# Patient Record
Sex: MALE | Race: BLACK OR AFRICAN AMERICAN | Employment: UNEMPLOYED | URBAN - METROPOLITAN AREA
[De-identification: names, ages, dates, MRNs, and addresses within clinical notes are randomized per-mention and may not be internally consistent; named-entity substitution may affect disease eponyms.]

---
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
## 2020-04-07 ENCOUNTER — TELEPHONE (OUTPATIENT)
Dept: FAMILY MEDICINE CLINIC | Age: 20
End: 2020-04-07

## 2020-04-07 ENCOUNTER — VIRTUAL VISIT (OUTPATIENT)
Dept: FAMILY MEDICINE CLINIC | Age: 20
End: 2020-04-07
Payer: COMMERCIAL

## 2020-04-07 VITALS — WEIGHT: 308 LBS | BODY MASS INDEX: 39.54 KG/M2 | TEMPERATURE: 99.4 F

## 2020-04-07 DIAGNOSIS — E11.9 NEW ONSET TYPE 2 DIABETES MELLITUS (HCC): ICD-10-CM

## 2020-04-07 DIAGNOSIS — R73.9 HYPERGLYCEMIA: ICD-10-CM

## 2020-04-07 LAB
A/G RATIO: 2 (ref 1.1–2.2)
ALBUMIN SERPL-MCNC: 4.7 G/DL (ref 3.4–5)
ALP BLD-CCNC: 130 U/L (ref 40–129)
ALT SERPL-CCNC: 59 U/L (ref 10–40)
ANION GAP SERPL CALCULATED.3IONS-SCNC: 18 MMOL/L (ref 3–16)
AST SERPL-CCNC: 31 U/L (ref 15–37)
BILIRUB SERPL-MCNC: 1.9 MG/DL (ref 0–1)
BUN BLDV-MCNC: 11 MG/DL (ref 7–20)
CALCIUM SERPL-MCNC: 10.2 MG/DL (ref 8.3–10.6)
CHLORIDE BLD-SCNC: 93 MMOL/L (ref 99–110)
CHOLESTEROL, TOTAL: 160 MG/DL (ref 0–199)
CO2: 27 MMOL/L (ref 21–32)
CREAT SERPL-MCNC: 1.9 MG/DL (ref 0.9–1.3)
CREATININE URINE: 101.3 MG/DL (ref 39–259)
GFR AFRICAN AMERICAN: 55
GFR NON-AFRICAN AMERICAN: 46
GLOBULIN: 2.4 G/DL
GLUCOSE BLD-MCNC: 483 MG/DL (ref 70–99)
HDLC SERPL-MCNC: 32 MG/DL (ref 40–60)
LDL CHOLESTEROL CALCULATED: 86 MG/DL
MICROALBUMIN UR-MCNC: <1.2 MG/DL
MICROALBUMIN/CREAT UR-RTO: NORMAL MG/G (ref 0–30)
POTASSIUM SERPL-SCNC: 4.6 MMOL/L (ref 3.5–5.1)
SODIUM BLD-SCNC: 138 MMOL/L (ref 136–145)
TOTAL PROTEIN: 7.1 G/DL (ref 6.4–8.2)
TRIGL SERPL-MCNC: 212 MG/DL (ref 0–150)
VLDLC SERPL CALC-MCNC: 42 MG/DL

## 2020-04-07 PROCEDURE — 99214 OFFICE O/P EST MOD 30 MIN: CPT | Performed by: NURSE PRACTITIONER

## 2020-04-07 RX ORDER — LANCETS 30 GAUGE
1 EACH MISCELLANEOUS 2 TIMES DAILY
Qty: 100 EACH | Refills: 1 | Status: SHIPPED | OUTPATIENT
Start: 2020-04-07

## 2020-04-07 RX ORDER — METFORMIN HYDROCHLORIDE 500 MG/1
500 TABLET, EXTENDED RELEASE ORAL
Qty: 30 TABLET | Refills: 2 | Status: SHIPPED | OUTPATIENT
Start: 2020-04-07 | End: 2020-05-22

## 2020-04-07 RX ORDER — BLOOD-GLUCOSE METER
1 KIT MISCELLANEOUS DAILY
Qty: 1 KIT | Refills: 0 | Status: SHIPPED | OUTPATIENT
Start: 2020-04-07 | End: 2020-05-22

## 2020-04-07 ASSESSMENT — ENCOUNTER SYMPTOMS
ABDOMINAL PAIN: 0
CONSTIPATION: 0
NAUSEA: 0
SHORTNESS OF BREATH: 0
DIARRHEA: 0
COUGH: 0
VOMITING: 0

## 2020-04-07 ASSESSMENT — PATIENT HEALTH QUESTIONNAIRE - PHQ9
SUM OF ALL RESPONSES TO PHQ9 QUESTIONS 1 & 2: 0
SUM OF ALL RESPONSES TO PHQ QUESTIONS 1-9: 0
1. LITTLE INTEREST OR PLEASURE IN DOING THINGS: 0
SUM OF ALL RESPONSES TO PHQ QUESTIONS 1-9: 0
2. FEELING DOWN, DEPRESSED OR HOPELESS: 0

## 2020-04-07 NOTE — TELEPHONE ENCOUNTER
MOP called very concerned about pts blood sugar. Was diagnosed last year with prediabetes  She states as of the last week he has been very fatigued, c/o nausea, frequent urination. Mom is diabetic so she checked his sugar. Was 220 last night and 487 this morning. She is very concerned and want him to be seen. Can I schedule him with you at [facility]?

## 2020-04-07 NOTE — PROGRESS NOTES
2020    TELEHEALTH EVALUATION -- Audio/Visual (During ETARV-47 public health emergency)    HPI:    Suraj Sandoval (:  2000) has requested an audio/video evaluation for the following concern(s):  Chief Complaint   Patient presents with    Other     glucose this morning was 487     Moraima Langford has history of prediabetes and last seen his PCP 2019. Patient reports that he has had increased fatigue for the past couple of days. Denies abd pain. Positive for increased urinary frequency. Denies dysuria. Currently drinking water. Reports that prior he was drinking 12 pack of mountain dew per day. Does not exercise. Does not follow a specific diet. Reports that his mother checked his BS and it was 487 this AM. Last night was 282. His mother has DM. Lab Results   Component Value Date    LABA1C 5.6 2019     No results found for: LABMICR, CREATININE  No results found for: ALT, AST  No results found for: CHOL, TRIG, HDL, LDLCALC, LDLDIRECT       Review of Systems   Constitutional: Positive for activity change and fatigue. Negative for fever. Respiratory: Negative for cough and shortness of breath. Cardiovascular: Negative for chest pain. Gastrointestinal: Negative for abdominal pain, constipation, diarrhea, nausea and vomiting. Genitourinary: Positive for frequency. Negative for difficulty urinating, dysuria and hematuria. Neurological: Negative for dizziness and headaches. Prior to Visit Medications    Medication Sig Taking?  Authorizing Provider   montelukast (SINGULAIR) 10 MG tablet Take 1 tablet by mouth nightly Yes Sundeep Cerna MD   CPAP Machine MISC by Does not apply route Yes Historical Provider, MD   fluticasone (FLONASE) 50 MCG/ACT nasal spray SPRAY 2 SPRAYS INTO EACH NOSTRIL EVERY DAY  Patient not taking: Reported on 2020  Sundeep Cerna MD   cetirizine (ZYRTEC) 10 MG tablet Take 1 tablet by mouth daily  Patient not taking: Reported on 2019  [facility] been advised to contact this office for worsening conditions or problems, and seek emergency medical treatment and/or call 911 if deemed necessary. Services were provided through a video synchronous discussion virtually to substitute for in-person clinic visit. Patient and provider were located at their individual homes. --OCTAVIANO Blackmon - CNP on 4/7/2020 at 10:30 AM    An electronic signature was used to authenticate this note.

## 2020-04-08 ENCOUNTER — PATIENT MESSAGE (OUTPATIENT)
Dept: FAMILY MEDICINE CLINIC | Age: 20
End: 2020-04-08

## 2020-04-08 PROBLEM — E11.9 DIABETES MELLITUS, NEW ONSET (HCC): Status: ACTIVE | Noted: 2020-04-08

## 2020-04-08 LAB
ESTIMATED AVERAGE GLUCOSE: 263.3 MG/DL
HBA1C MFR BLD: 10.8 %

## 2020-04-08 RX ORDER — INSULIN GLARGINE 100 [IU]/ML
10 INJECTION, SOLUTION SUBCUTANEOUS NIGHTLY
Qty: 5 PEN | Refills: 0 | Status: SHIPPED | OUTPATIENT
Start: 2020-04-08 | End: 2020-04-13 | Stop reason: SDUPTHER

## 2020-04-10 DIAGNOSIS — E11.9 DIABETES MELLITUS, NEW ONSET (HCC): ICD-10-CM

## 2020-04-11 LAB — C-PEPTIDE: 3.2 NG/ML (ref 1.1–4.4)

## 2020-04-13 LAB — GLUTAMIC ACID DECARB AB: <5 IU/ML (ref 0–5)

## 2020-04-13 RX ORDER — INSULIN GLARGINE 100 [IU]/ML
15 INJECTION, SOLUTION SUBCUTANEOUS NIGHTLY
Qty: 5 PEN | Refills: 0 | Status: SHIPPED | OUTPATIENT
Start: 2020-04-13 | End: 2020-04-21 | Stop reason: SDUPTHER

## 2020-04-14 LAB
INSULIN A: <0.4 U/ML (ref 0–0.4)
PROINSULIN: 6.2 PMOL/L

## 2020-04-16 LAB — MISCELLANEOUS LAB TEST ORDER: NORMAL

## 2020-04-20 ENCOUNTER — PATIENT MESSAGE (OUTPATIENT)
Dept: FAMILY MEDICINE CLINIC | Age: 20
End: 2020-04-20

## 2020-04-21 RX ORDER — INSULIN GLARGINE 100 [IU]/ML
17 INJECTION, SOLUTION SUBCUTANEOUS NIGHTLY
Qty: 5 PEN | Refills: 0 | Status: SHIPPED | OUTPATIENT
Start: 2020-04-21 | End: 2020-04-29 | Stop reason: DRUGHIGH

## 2020-04-21 NOTE — TELEPHONE ENCOUNTER
From: Nhung Burgess  To: Elisabeth Pallas, MD  Sent: 4/20/2020 7:33 PM EDT  Subject: Non-Urgent Medical Question    Blood sugars for the week. Thank you and stay healthy.

## 2020-04-29 ENCOUNTER — VIRTUAL VISIT (OUTPATIENT)
Dept: FAMILY MEDICINE CLINIC | Age: 20
End: 2020-04-29
Payer: COMMERCIAL

## 2020-04-29 PROBLEM — R73.03 PREDIABETES: Status: RESOLVED | Noted: 2019-09-09 | Resolved: 2020-04-29

## 2020-04-29 PROCEDURE — 99213 OFFICE O/P EST LOW 20 MIN: CPT | Performed by: FAMILY MEDICINE

## 2020-04-29 RX ORDER — INSULIN GLARGINE 100 [IU]/ML
20 INJECTION, SOLUTION SUBCUTANEOUS NIGHTLY
Qty: 5 PEN | Refills: 0 | Status: SHIPPED | OUTPATIENT
Start: 2020-04-29 | End: 2020-09-16 | Stop reason: SDUPTHER

## 2020-04-29 NOTE — PROGRESS NOTES
TELEHEALTH EVALUATION -- Audio/Visual (During WKETE-72 public health emergency)  Chief Complaint   Patient presents with    Diabetes     Diabetes Mellitus Follow-up  Subjective:     Brien Mares is an 23 y.o. male who presents for follow up of diabetes. 1. Uncontrolled diabetes mellitus type 2 without complications ([address]    2. Low HDL (under 40)    3. Class 1 obesity due to excess calories with serious comorbidity in adult, unspecified BMI      Patient checks sugars         today  time(s) daily. -210  Patent follows diabetic diet? Yes, thinks has lost weight  Patient exercises regularly? Yes, daily  Any shortness of breath? No  Any chest pain? No  Any leg numbness or tingling? No  Any leg swelling? No  Any vision changes? No    1. Uncontrolled diabetes mellitus type 2 without complications ([address]    2. Low HDL (under 40)    3. Class 1 obesity due to excess calories with serious comorbidity in adult, unspecified BMI      HISTORY:  Patient's medications, allergies, past medical, and social histories were reviewed and updated as appropriate. CHART REVIEW  Health Maintenance   Topic Date Due    Pneumococcal 0-64 years Vaccine (1 of 1 - PPSV23) 12/04/2006    Diabetic foot exam  12/04/2010    Diabetic retinal exam  12/04/2010    HIV screen  12/04/2015    A1C test (Diabetic or Prediabetic)  07/07/2020    Flu vaccine (Season Ended) 09/01/2020    Diabetic microalbuminuria test  04/07/2021    Lipid screen  04/07/2021    DTaP/Tdap/Td vaccine (7 - Td) 04/24/2022    Hepatitis B vaccine  Completed    Hib vaccine  Completed    HPV vaccine  Completed    Varicella vaccine  Completed    Meningococcal (ACWY) vaccine  Completed    Hepatitis A vaccine  Aged Out     The ASCVD Risk score (Paulina Kay, et al., 2013) failed to calculate for the following reasons:     The 2013 ASCVD risk score is only valid for ages 36 to 78  Prior to Visit Medications

## 2020-05-22 RX ORDER — BLOOD-GLUCOSE METER
EACH MISCELLANEOUS
Qty: 1 KIT | Refills: 0 | Status: SHIPPED | OUTPATIENT
Start: 2020-05-22

## 2020-05-22 RX ORDER — METFORMIN HYDROCHLORIDE 500 MG/1
TABLET, EXTENDED RELEASE ORAL
Qty: 30 TABLET | Refills: 2 | Status: SHIPPED | OUTPATIENT
Start: 2020-05-22 | End: 2020-12-02

## 2020-09-16 ENCOUNTER — TELEPHONE (OUTPATIENT)
Dept: FAMILY MEDICINE CLINIC | Age: 20
End: 2020-09-16

## 2020-09-16 RX ORDER — INSULIN GLARGINE 100 [IU]/ML
20 INJECTION, SOLUTION SUBCUTANEOUS NIGHTLY
Qty: 5 PEN | Refills: 0 | Status: SHIPPED | OUTPATIENT
Start: 2020-09-16 | End: 2020-12-02 | Stop reason: DRUGHIGH

## 2020-11-08 ENCOUNTER — HOSPITAL ENCOUNTER (EMERGENCY)
Age: 20
Discharge: LEFT BEFORE DISCHARGE | End: 2020-11-08
Payer: COMMERCIAL

## 2020-11-08 VITALS
WEIGHT: 306.44 LBS | DIASTOLIC BLOOD PRESSURE: 80 MMHG | HEART RATE: 80 BPM | BODY MASS INDEX: 39.33 KG/M2 | RESPIRATION RATE: 16 BRPM | SYSTOLIC BLOOD PRESSURE: 119 MMHG | OXYGEN SATURATION: 99 % | TEMPERATURE: 98.1 F | HEIGHT: 74 IN

## 2020-11-08 ASSESSMENT — PAIN DESCRIPTION - PAIN TYPE: TYPE: ACUTE PAIN

## 2020-11-08 ASSESSMENT — PAIN DESCRIPTION - LOCATION: LOCATION: ABDOMEN

## 2020-11-08 ASSESSMENT — PAIN DESCRIPTION - DESCRIPTORS: DESCRIPTORS: DISCOMFORT

## 2020-11-08 ASSESSMENT — PAIN SCALES - GENERAL: PAINLEVEL_OUTOF10: 3

## 2020-11-08 ASSESSMENT — PAIN DESCRIPTION - ORIENTATION: ORIENTATION: RIGHT;LOWER

## 2020-11-09 ENCOUNTER — OFFICE VISIT (OUTPATIENT)
Dept: FAMILY MEDICINE CLINIC | Age: 20
End: 2020-11-09
Payer: COMMERCIAL

## 2020-11-09 VITALS
BODY MASS INDEX: 39.53 KG/M2 | HEIGHT: 74 IN | RESPIRATION RATE: 14 BRPM | HEART RATE: 82 BPM | OXYGEN SATURATION: 97 % | WEIGHT: 308 LBS | TEMPERATURE: 96.8 F | DIASTOLIC BLOOD PRESSURE: 84 MMHG | SYSTOLIC BLOOD PRESSURE: 130 MMHG

## 2020-11-09 LAB — HBA1C MFR BLD: 5.6 %

## 2020-11-09 PROCEDURE — 99214 OFFICE O/P EST MOD 30 MIN: CPT | Performed by: FAMILY MEDICINE

## 2020-11-09 PROCEDURE — 83036 HEMOGLOBIN GLYCOSYLATED A1C: CPT | Performed by: FAMILY MEDICINE

## 2020-11-09 NOTE — PROGRESS NOTES
(139.7 kg) (>99 %, Z= 3.14)*     * Growth percentiles are based on [facility] (Boys, 2-20 Years) data. BP Readings from Last 3 Encounters:   11/09/20 130/84   11/08/20 119/80   09/20/19 112/80      Current Outpatient Medications   Medication Sig Dispense Refill    insulin glargine (LANTUS SOLOSTAR) 100 UNIT/ML injection pen Inject 20 Units into the skin nightly 5 pen 0    metFORMIN (GLUCOPHAGE-XR) 500 MG extended release tablet TAKE 1 TABLET BY MOUTH TWICE DAILY WITH MEALS 30 tablet 2    Blood Glucose Monitoring Suppl (ONE TOUCH ULTRA 2) w/Device KIT USE AS DIRECTED 1 kit 0    Insulin Pen Needle 32G X 4 MM MISC Inject 1 each into the skin daily 100 each 2    blood glucose test strips (ASCENSIA AUTODISC VI;ONE TOUCH ULTRA TEST VI) strip 1 each by In Vitro route 2 times daily As needed. 100 each 3    Lancets MISC 1 each by Does not apply route 2 times daily 100 each 1    fluticasone (FLONASE) 50 MCG/ACT nasal spray SPRAY 2 SPRAYS INTO EACH NOSTRIL EVERY DAY 16 g 11    cetirizine (ZYRTEC) 10 MG tablet Take 1 tablet by mouth daily 90 tablet 1    montelukast (SINGULAIR) 10 MG tablet Take 1 tablet by mouth nightly 90 tablet 1    CPAP Machine MISC by Does not apply route       No current facility-administered medications for this visit.        Immunization History   Administered Date(s) Administered    DTaP vaccine 02/26/2001, 04/12/2001, 06/01/2001, 12/26/2001, 02/27/2002, 09/22/2005    HPV 9-valent Axel Curtis) 07/12/2013, 10/10/2014, 05/01/2015, 11/19/2018    Hepatitis B 02/26/2001, 04/12/2001, 12/26/2001    Hepatitis B Adult (Recombivax HB) 04/12/2001, 06/01/2001, 12/26/2001    Hib vaccine 02/26/2001, 04/12/2001, 06/01/2001, 12/26/2001, 02/27/2002, 06/17/2002    Influenza Vaccine, unspecified formulation 10/27/2008, 12/13/2010, 10/20/2011, 12/06/2012, 10/10/2014, 10/20/2015, 11/14/2016, 09/21/2018    Influenza, Quadv, IM, PF (6 mo and older Fluzone, Flulaval, Fluarix, and 3 yrs and older Afluria) 09/14/2018    MMR 02/27/2002, 09/22/2005    Meningococcal MCV4P (Menactra) 04/24/2012, 11/17/2017    Pneumococcal Conjugate 13-valent (Alejandro Durham) 04/12/2001, 06/01/2001, 12/26/2001    Pneumococcal Conjugate 7-valent (Marty Basilio) 04/12/2001, 06/01/2001, 12/26/2001    Polio IPV (IPOL) 02/26/2001, 04/12/2001, 12/26/2001, 02/27/2002, 09/22/2005    Tdap (Boostrix, Adacel) 04/24/2012    Varicella (Varivax) 12/26/2001, 10/20/2011        Social History     Tobacco Use    Smoking status: Never Smoker    Smokeless tobacco: Never Used   Substance Use Topics    Alcohol use: Not on file    Drug use: Not on file      Review of Systems As above     Objective:   Physical Exam  Vitals signs and nursing note reviewed. Constitutional:       General: He is not in acute distress. Appearance: Normal appearance. He is well-developed. He is not diaphoretic. Neck:      Musculoskeletal: Normal range of motion and neck supple. Cardiovascular:      Rate and Rhythm: Normal rate and regular rhythm. Pulses: Normal pulses. Heart sounds: Normal heart sounds. No murmur. Pulmonary:      Effort: Pulmonary effort is normal. No respiratory distress. Breath sounds: Normal breath sounds. No wheezing or rales. Abdominal:      General: Abdomen is flat. Bowel sounds are normal. There is no distension. Palpations: Abdomen is soft. There is no mass. Tenderness: There is no abdominal tenderness. There is no right CVA tenderness, left CVA tenderness, guarding or rebound. Hernia: No hernia is present. Musculoskeletal:      Right lower leg: No edema. Left lower leg: No edema. Lymphadenopathy:      Cervical: No cervical adenopathy. Skin:     General: Skin is warm and dry. Neurological:      General: No focal deficit present. Mental Status: He is alert and oriented to person, place, and time. Mental status is at baseline.    Psychiatric:         Mood and Affect: Mood normal.         Behavior: Behavior normal.         Thought Content: Thought content normal.         Judgment: Judgment normal.         Assessment:      1. Type 2 diabetes mellitus without complication, with long-term current use of insulin ([address] onset 4/20.  a1c improved to 5.6%. Reduce dose of Lantus to 16 units due to symptoms of hypoglycemia and low a1c. Referred to diab ed. Continue to monitor glucose at least once a day, record in sugar log and bring to all visits with Dr Toney. Retest bmp today- was azotemic (likely dehydration) in April, when hyperglycemia.    - POCT glycosylated hemoglobin (Hb A1C)  - [facility] Medication Mg[facility] (Diabetes - Clinical Pharmacy) - [place]    2. Right lower quadrant abdominal pain  - suspect viral. Now resolved. Unable to palpate any tenderness or induce pain with any motion or action. Plan:      F/u with PCP in 3 mo. He is instructed to call right away if his abd pain returns or new symptoms arise.         Joseph Amezcua MD

## 2020-11-25 ENCOUNTER — TELEPHONE (OUTPATIENT)
Dept: PHARMACY | Age: 20
End: 2020-11-25

## 2020-11-25 NOTE — TELEPHONE ENCOUNTER
Reached out to Mr. Dawn Fitch to schedule an appointment for Diabetes education. He was referred by Dr. Gato Mcfarlane. New Diabetes diagnosis 4/2020. Currently taking Lantus. Metformin stopped due to diarrhea, Advised to check BG at least daily and bring log to Dr. Kit Tong appointments. A1c improved from 10.8 4/7/20 to 5.6 11/9/20. No answer, left message to please return our call.      Dominic Carroll, [address] [facility]  Diabetes Service  [phone]

## 2020-12-02 ENCOUNTER — PATIENT MESSAGE (OUTPATIENT)
Dept: FAMILY MEDICINE CLINIC | Age: 20
End: 2020-12-02

## 2020-12-02 ENCOUNTER — OFFICE VISIT (OUTPATIENT)
Dept: FAMILY MEDICINE CLINIC | Age: 20
End: 2020-12-02
Payer: COMMERCIAL

## 2020-12-02 VITALS
BODY MASS INDEX: 39.66 KG/M2 | TEMPERATURE: 97.5 F | HEART RATE: 87 BPM | SYSTOLIC BLOOD PRESSURE: 120 MMHG | OXYGEN SATURATION: 99 % | HEIGHT: 74 IN | DIASTOLIC BLOOD PRESSURE: 76 MMHG | WEIGHT: 309 LBS | RESPIRATION RATE: 16 BRPM

## 2020-12-02 PROBLEM — F41.9 ANXIETY: Status: ACTIVE | Noted: 2020-12-02

## 2020-12-02 PROCEDURE — 90686 IIV4 VACC NO PRSV 0.5 ML IM: CPT | Performed by: FAMILY MEDICINE

## 2020-12-02 PROCEDURE — 99213 OFFICE O/P EST LOW 20 MIN: CPT | Performed by: FAMILY MEDICINE

## 2020-12-02 PROCEDURE — 90471 IMMUNIZATION ADMIN: CPT | Performed by: FAMILY MEDICINE

## 2020-12-02 RX ORDER — ESCITALOPRAM OXALATE 10 MG/1
10 TABLET ORAL DAILY
Qty: 30 TABLET | Refills: 3 | Status: SHIPPED | OUTPATIENT
Start: 2020-12-02 | End: 2021-03-05

## 2020-12-02 RX ORDER — CANAGLIFLOZIN 300 MG/1
300 TABLET, FILM COATED ORAL
Qty: 30 TABLET | Refills: 5 | Status: SHIPPED | OUTPATIENT
Start: 2020-12-02 | End: 2021-05-06

## 2020-12-02 RX ORDER — INSULIN GLARGINE 100 [IU]/ML
16 INJECTION, SOLUTION SUBCUTANEOUS NIGHTLY
Qty: 5 PEN | Refills: 0 | Status: SHIPPED
Start: 2020-12-02 | End: 2021-05-06

## 2020-12-02 NOTE — TELEPHONE ENCOUNTER
From: Tanika Shoemaker  To: Ramesh Naik MD  Sent: 12/2/2020 3:16 PM EST  Subject: Prescription Question    My insurance doesn't cover invoking. The pharmacy said ask you for an alternative.    Thank you

## 2020-12-02 NOTE — PATIENT INSTRUCTIONS
INSTRUCTIONS  NEXT APPOINTMENT: Please schedule check-up in 6 weeks. · PLEASE TAKE THIS FORM TO CHECK-OUT WINDOW TO SCHEDULE NEXT VISIT. · Change insulin to pill (Invokana, [place], Farxiga, Steglatro depending on insurance preference). · Start lexapro for anxiety. · See counsellor.

## 2020-12-02 NOTE — TELEPHONE ENCOUNTER
His [phone] number states he is not accepting calls at this time. I also tried the home number for his parent Tita as well as the mobile number. One says not available and the other states not accepting calls at this time. We will follow along and attempt to schedule.     Amy Javed, [address] [facility]  Diabetes Service  [phone]

## 2020-12-02 NOTE — PROGRESS NOTES
DIABETES MELLITUS FOLOW-UP  Subjective:      Chief Complaint   Patient presents with    Diabetes     Myles Astorga is an 23 y.o. male who presents for follow up of following chronic problems:  1. Controlled type 2 diabetes mellitus without complication, with long-term current use of insulin ([address]    2. Anxiety NEW   3. Needs flu shot      Complaints: pt states he is doing well. Last time he was in he saw dr Roman Hernandez and he lowered his insulin to 16 U. Has cut out soda. BS running highest 176  · Pt states anxiety and depression has been off and on, pt states his trigger for anxiety is being in public places with a lot of people but being here is ok. He always has to have someone with him. He is sleeping ok. Worries about how he looks to other people. · [address] working on getting him in with therapist.    · Patient checks sugars 1  time(s) daily. Average: 105. · Any low sugars? Yes  · Patent follows diabetic diet? Sometimes  · Exercise: walking intermittently  · Taking medicines daily as directed? no  · Is the patient reporting any side effects of medications? No  · Patient checks bottom of feet daily? Yes  · Tobacco history updated:  reports that he has never smoked. He has never used smokeless tobacco.    Review of Systems   General ROS: fever? No,   Night sweats? No  Ophthalmic ROS: change in vision? No  Endocrine ROS: fatigue? No  Unexpected weight changes? No  Respiratory ROS: Shortness of breath? No  Cardiovascular ROS: chest pain? No  Gastrointestinal ROS: abdominal pain? No  Change in stools? No  Genito-Urinary ROS: painful urination? No  Trouble urinating? No  Neurological ROS: TIA or stroke symptoms? No  Numbness/tingling? No  Dermatological ROS: rash or sores on feet? No  Changes in skin spots?     No    Health Maintenance Due   Topic Date Due    Diabetic foot exam  12/04/2010    Diabetic retinal exam  12/04/2010    HIV screen  12/04/2015     LAST LABS  LDL Calculated   Date Value Ref Range Status   04/07/2020 86 <100 mg/dL Final     Lab Results   Component Value Date    HDL 32 (L) 04/07/2020     Lab Results   Component Value Date    TRIG 212 (H) 04/07/2020     Lab Results   Component Value Date    GLUCOSE 483 (H) 04/07/2020     Lab Results   Component Value Date     04/07/2020    K 4.6 04/07/2020    CREATININE 1.9 (H) 04/07/2020     No results found for: WBC, HGB, PLT  Lab Results   Component Value Date    ALT 59 (H) 04/07/2020    AST 31 04/07/2020    ALKPHOS 130 (H) 04/07/2020    BILITOT 1.9 (H) 04/07/2020     No results found for: TSH  Lab Results   Component Value Date    LABA1C 5.6 11/09/2020    LABA1C 10.8 04/07/2020    LABA1C 5.6 09/09/2019     *Chief complaint, HPI, History and ROS provided by the medical assistant has been reviewed and verified by provider- Los Gruber MD    LAST LABS  LDL Calculated   Date Value Ref Range Status   04/07/2020 86 <100 mg/dL Final     Lab Results   Component Value Date    HDL 32 (L) 04/07/2020     Lab Results   Component Value Date    TRIG 212 (H) 04/07/2020     Lab Results   Component Value Date    GLUCOSE 483 (H) 04/07/2020     Lab Results   Component Value Date     04/07/2020    K 4.6 04/07/2020    CREATININE 1.9 (H) 04/07/2020     No results found for: WBC, HGB, PLT  Lab Results   Component Value Date    ALT 59 (H) 04/07/2020    AST 31 04/07/2020    ALKPHOS 130 (H) 04/07/2020    BILITOT 1.9 (H) 04/07/2020     No results found for: TSH  Lab Results   Component Value Date    LABA1C 5.6 11/09/2020    LABA1C 10.8 04/07/2020    LABA1C 5.6 09/09/2019     HISTORY:  Patient's medications, allergies, past medical, and social histories were reviewed and updated as appropriate.      CHART REVIEW  Health Maintenance Due   Topic Date Due    Diabetic foot exam  12/04/2010    Diabetic retinal exam  12/04/2010    HIV screen  12/04/2015     Social History     Tobacco Use    Smoking status: Never Smoker    Smokeless tobacco: Never Used   Substance Use Topics    Alcohol use: Not on file    Drug use: Not on file      The ASCVD Risk score (Anival Portillo, et al., 2013) failed to calculate for the following reasons: The 2013 ASCVD risk score is only valid for ages 36 to 78  Prior to Visit Medications    Medication Sig Taking? Authorizing Provider   insulin glargine (LANTUS SOLOSTAR) 100 UNIT/ML injection pen Inject 16 Units into the skin nightly Yes Gorge Crouch MD   canagliflozin (INVOKANA) 300 MG TABS tablet Take 1 tablet by mouth every morning (before breakfast) Yes Gorge Crouch MD   escitalopram (LEXAPRO) 10 MG tablet Take 1 tablet by mouth daily Yes Gorge Crouch MD   Blood Glucose Monitoring Suppl (ONE TOUCH ULTRA 2) w/Device KIT USE AS DIRECTED Yes OCTAVIANO Bond CNP   Insulin Pen Needle 32G X 4 MM MISC Inject 1 each into the skin daily Yes OCTAVIANO Bond CNP   blood glucose test strips (ASCENSIA AUTODISC VI;ONE TOUCH ULTRA TEST VI) strip 1 each by In Vitro route 2 times daily As needed.  Yes OCTAVIANO Bond CNP   Lancets MISC 1 each by Does not apply route 2 times daily Yes OCTAVIANO Bond CNP   fluticasone (FLONASE) 50 MCG/ACT nasal spray SPRAY 2 SPRAYS INTO EACH NOSTRIL EVERY DAY Yes Gorge Crouch MD   CPAP Machine MISC by Does not apply route Yes Historical Provider, MD   cetirizine (ZYRTEC) 10 MG tablet Take 1 tablet by mouth daily  Patient not taking: Reported on 12/2/2020  Gorge Crouch MD   montelukast (SINGULAIR) 10 MG tablet Take 1 tablet by mouth nightly  Patient not taking: Reported on 12/2/2020  Gorge Crouch MD      Family History   Problem Relation Age of Onset    Sleep Apnea Maternal Grandmother     Asthma Maternal Grandmother     Diabetes Mother     Asthma Mother     Asthma Father     Asthma Maternal Grandfather      Objective:   PHYSICAL EXAM   /76 (Site: Left Upper Arm, Position: Sitting, Cuff Size: Large Adult)   Pulse 87   Temp 97.5 °F (36.4 °C) (Temporal)   Resp 16   Ht 6' 2\" (1.88 m)   Wt (!) 309 lb (140.2 kg)   SpO2 99%   BMI 39.67 kg/m²   BP Readings from Last 5 Encounters:   12/02/20 120/76   11/09/20 130/84   11/08/20 119/80   09/20/19 112/80   09/09/19 118/76     Wt Readings from Last 5 Encounters:   12/02/20 (!) 309 lb (140.2 kg) (>99 %, Z= 3.15)*   11/09/20 (!) 308 lb (139.7 kg) (>99 %, Z= 3.15)*   11/08/20 (!) 306 lb 7 oz (139 kg) (>99 %, Z= 3.13)*   04/07/20 (!) 308 lb (139.7 kg) (>99 %, Z= 3.14)*   09/20/19 (!) 324 lb (147 kg) (>99 %, Z= 3.27)*     * Growth percentiles are based on CDC (Boys, 2-20 Years) data. GENERAL:   · well-developed, well-nourished, alert, no distress. EYES:   · External findings: lids and lashes normal and conjunctivae and sclerae normal  LUNGS:    · Breathing unlabored  PSYCH:  good grooming, good insight, normal perception, normal reasoning and normal speech pattern and content and normal thought patterns, flattened affect  MUSCULOSKEL:    · No significant finger or nail findings  NEURO:   · CN 2-12 intact     Assessment and Plan:      Diagnosis Orders   1. Controlled type 2 diabetes mellitus without complication, with long-term current use of insulin (HCC)  insulin glargine (LANTUS SOLOSTAR) 100 UNIT/ML injection pen    canagliflozin (INVOKANA) 300 MG TABS tablet   2. Anxiety  Ambulatory referral to Psychology    escitalopram (LEXAPRO) 10 MG tablet   3. Needs flu shot  INFLUENZA, QUADV, 3 YRS AND OLDER, IM PF, PREFILL SYR OR SDV, 0.5ML (AFLURIA QUADV, PF)   Stable. Continue current Tx plan. Any changes marked below. INSTRUCTIONS  NEXT APPOINTMENT: Please schedule check-up in 6 weeks. · PLEASE TAKE THIS FORM TO CHECK-OUT WINDOW TO SCHEDULE NEXT VISIT. · Change insulin to pill (Invokana, [place], Farxiga, Steglatro depending on insurance preference). · Start lexapro for anxiety. · See counsellor.

## 2020-12-09 NOTE — TELEPHONE ENCOUNTER
Left a message for  Hanny Debbie to please return my call to schedule an appointment in our center. I reached out to his mother as well.   She will have him call us

## 2020-12-14 NOTE — TELEPHONE ENCOUNTER
I did not hear from Mr. Shannen Velarde after 3 attempts and speaking with his mother. I will discharge him from our services. He is always welcome to return at a later date.      Morena Tobias, [address] [facility]  Diabetes Service  [phone]

## 2021-03-05 DIAGNOSIS — F41.9 ANXIETY: ICD-10-CM

## 2021-03-05 RX ORDER — ESCITALOPRAM OXALATE 10 MG/1
10 TABLET ORAL DAILY
Qty: 30 TABLET | Refills: 1 | Status: SHIPPED | OUTPATIENT
Start: 2021-03-05 | End: 2021-05-06 | Stop reason: SDUPTHER

## 2021-04-19 RX ORDER — MONTELUKAST SODIUM 10 MG/1
10 TABLET ORAL NIGHTLY
Qty: 90 TABLET | Refills: 1 | Status: SHIPPED | OUTPATIENT
Start: 2021-04-19 | End: 2021-05-06 | Stop reason: SDUPTHER

## 2021-05-06 ENCOUNTER — OFFICE VISIT (OUTPATIENT)
Dept: FAMILY MEDICINE CLINIC | Age: 21
End: 2021-05-06
Payer: COMMERCIAL

## 2021-05-06 VITALS
TEMPERATURE: 98.4 F | SYSTOLIC BLOOD PRESSURE: 128 MMHG | DIASTOLIC BLOOD PRESSURE: 84 MMHG | HEART RATE: 77 BPM | RESPIRATION RATE: 16 BRPM | WEIGHT: 295.6 LBS | BODY MASS INDEX: 37.94 KG/M2 | OXYGEN SATURATION: 98 % | HEIGHT: 74 IN

## 2021-05-06 DIAGNOSIS — E11.9 CONTROLLED TYPE 2 DIABETES MELLITUS WITHOUT COMPLICATION, WITH LONG-TERM CURRENT USE OF INSULIN (HCC): Primary | ICD-10-CM

## 2021-05-06 DIAGNOSIS — Z79.4 CONTROLLED TYPE 2 DIABETES MELLITUS WITHOUT COMPLICATION, WITH LONG-TERM CURRENT USE OF INSULIN (HCC): Primary | ICD-10-CM

## 2021-05-06 DIAGNOSIS — F41.9 ANXIETY: ICD-10-CM

## 2021-05-06 DIAGNOSIS — Z91.09 ENVIRONMENTAL ALLERGIES: ICD-10-CM

## 2021-05-06 LAB — HBA1C MFR BLD: 5.3 %

## 2021-05-06 PROCEDURE — 99214 OFFICE O/P EST MOD 30 MIN: CPT | Performed by: FAMILY MEDICINE

## 2021-05-06 PROCEDURE — 83036 HEMOGLOBIN GLYCOSYLATED A1C: CPT | Performed by: FAMILY MEDICINE

## 2021-05-06 RX ORDER — ESCITALOPRAM OXALATE 20 MG/1
20 TABLET ORAL DAILY
Qty: 30 TABLET | Refills: 2 | Status: SHIPPED | OUTPATIENT
Start: 2021-05-06

## 2021-05-06 RX ORDER — MONTELUKAST SODIUM 10 MG/1
10 TABLET ORAL NIGHTLY
Qty: 90 TABLET | Refills: 1 | Status: SHIPPED | OUTPATIENT
Start: 2021-05-06 | End: 2021-11-08

## 2021-05-06 RX ORDER — CETIRIZINE HYDROCHLORIDE 10 MG/1
10 TABLET ORAL DAILY
Qty: 90 TABLET | Refills: 1 | Status: SHIPPED | OUTPATIENT
Start: 2021-05-06

## 2021-05-06 RX ORDER — FLUTICASONE PROPIONATE 50 MCG
SPRAY, SUSPENSION (ML) NASAL
Qty: 16 G | Refills: 11 | Status: SHIPPED | OUTPATIENT
Start: 2021-05-06

## 2021-05-06 ASSESSMENT — PATIENT HEALTH QUESTIONNAIRE - PHQ9
2. FEELING DOWN, DEPRESSED OR HOPELESS: 0
SUM OF ALL RESPONSES TO PHQ QUESTIONS 1-9: 1
SUM OF ALL RESPONSES TO PHQ9 QUESTIONS 1 & 2: 1

## 2021-05-06 NOTE — PROGRESS NOTES
daily Yes Tree Bolden MD   fluticasone (FLONASE) 50 MCG/ACT nasal spray SPRAY 2 SPRAYS INTO EACH NOSTRIL EVERY DAY Yes Tree Bolden MD   montelukast (SINGULAIR) 10 MG tablet Take 1 tablet by mouth nightly Yes Tree Bolden MD   Blood Glucose Monitoring Suppl (ONE TOUCH ULTRA 2) w/Device KIT USE AS DIRECTED Yes OCTAVIANO Rios CNP   Insulin Pen Needle 32G X 4 MM MISC Inject 1 each into the skin daily Yes OCTAVIANO Rios CNP   blood glucose test strips (ASCENSIA AUTODISC VI;ONE TOUCH ULTRA TEST VI) strip 1 each by In Vitro route 2 times daily As needed.  Yes OCTAVIANO Rios CNP   Lancets MISC 1 each by Does not apply route 2 times daily Yes OCTAVIANO Rios CNP   CPAP Machine MISC by Does not apply route Yes Historical Provider, MD      Family History   Problem Relation Age of Onset    Sleep Apnea Maternal Grandmother     Asthma Maternal Grandmother     Diabetes Mother     Asthma Mother     Asthma Father     Asthma Maternal Grandfather      Social History     Tobacco Use    Smoking status: Never Smoker    Smokeless tobacco: Never Used   Substance Use Topics    Alcohol use: Not on file    Drug use: Not on file      LAST LABS  Cholesterol, Total   Date Value Ref Range Status   04/07/2020 160 0 - 199 mg/dL Final     LDL Calculated   Date Value Ref Range Status   04/07/2020 86 <100 mg/dL Final     HDL   Date Value Ref Range Status   04/07/2020 32 (L) 40 - 60 mg/dL Final     Triglycerides   Date Value Ref Range Status   04/07/2020 212 (H) 0 - 150 mg/dL Final     Lab Results   Component Value Date    GLUCOSE 483 (H) 04/07/2020     Lab Results   Component Value Date     04/07/2020    K 4.6 04/07/2020    CREATININE 1.9 (H) 04/07/2020     No results found for: WBC, HGB, HCT, MCV, PLT  Lab Results   Component Value Date    ALT 59 (H) 04/07/2020    AST 31 04/07/2020    ALKPHOS 130 (H) 04/07/2020    BILITOT 1.9 (H) 04/07/2020     No results found for: TSH  Lab Results Component Value Date    LABA1C 5.3 05/06/2021     Objective:   PHYSICAL EXAM  /84 (Site: Right Upper Arm, Position: Sitting, Cuff Size: Large Adult)   Pulse 77   Temp 98.4 °F (36.9 °C) (Oral)   Resp 16   Ht 6' 2\" (1.88 m)   Wt 295 lb 9.6 oz (134.1 kg)   SpO2 98%   BMI 37.95 kg/m²   BP Readings from Last 5 Encounters:   05/06/21 128/84   12/02/20 120/76   11/09/20 130/84   11/08/20 119/80   09/20/19 112/80     Wt Readings from Last 5 Encounters:   05/06/21 295 lb 9.6 oz (134.1 kg)   12/02/20 (!) 309 lb (140.2 kg) (>99 %, Z= 3.15)*   11/09/20 (!) 308 lb (139.7 kg) (>99 %, Z= 3.15)*   11/08/20 (!) 306 lb 7 oz (139 kg) (>99 %, Z= 3.13)*   04/07/20 (!) 308 lb (139.7 kg) (>99 %, Z= 3.14)*     * Growth percentiles are based on CDC (Boys, 2-20 Years) data. GENERAL:   · well-developed, well-nourished, alert, no distress. LUNGS:    · Breathing unlabored  · clear to auscultation bilaterally and good air movement  CARDIOVASC:   · Regular rate and rhythm, S1, S2 normal. No murmur, click, rub or gallop  · LEGS:  Lower extremity edema: none    SKIN: warm and dry  PSYCH:    · Alert and oriented  · Normal reasoning, insight good  · Facial expressions full, mood appropriate      Assessment and Plan:      Diagnosis Orders   1. Controlled type 2 diabetes mellitus without complication, with long-term current use of insulin ([facility])  POCT glycosylated hemoglobin (Hb A1C)    dapagliflozin (FARXIGA) 5 MG tablet   2. Anxiety  escitalopram (LEXAPRO) 20 MG tablet    Ambulatory referral to Psychology   3. Environmental allergies- including cats, spring  fluticasone (FLONASE) 50 MCG/ACT nasal spray    montelukast (SINGULAIR) 10 MG tablet   not changed. Plan as above and below. INSTRUCTIONS  NEXT APPOINTMENT: Please schedule check-up in 3 months. · PLEASE TAKE THIS FORM TO CHECK-OUT WINDOW TO SCHEDULE NEXT VISIT. · You call to get in with counsellor/therapist soon.    · INCREASE lexapro to 20 mg.  · Start applying for job soon. · Do chores around the house as if it is a job. Work to earn [employer] time\"  · DECREASE farxiga to 5 mg.

## 2021-05-06 NOTE — PATIENT INSTRUCTIONS
INSTRUCTIONS  NEXT APPOINTMENT: Please schedule check-up in 3 months. · PLEASE TAKE THIS FORM TO CHECK-OUT WINDOW TO SCHEDULE NEXT VISIT. · You call to get in with counsellor/therapist soon. · INCREASE lexapro to 20 mg.  · Start applying for job soon. · Do chores around the house as if it is a job. Work to earn [employer] time\"  · DECREASE farxiga to 5 mg. Patient Education       GENERALIZED ANXIETY DISORDER    Overview   What is anxiety? Anxiety is a word that describes feelings of worry, nervousness, fear, apprehension, concern or restlessness. Normal feelings of anxiety often serve as an \"alarm system,\" alerting you to danger. For example, imagine coming home and finding a burglar in your living room. Your heart beats fast. Your palms get sweaty. Your mind races. In this situation, anxiety can provide an extra spark to help you get out of danger. In more normal but busy situations, anxiety can give you the energy to get things done. But sometimes anxiety can be out of control, giving you a sense of dread and fear for no apparent reason. This kind of anxiety can disrupt your life. Are there different types of anxiety? Yes. Anxiety can be a general feeling of worry, a sudden attack of panicky feelings, or a fear of a certain situation or object. What is generalized anxiety disorder? Generalized anxiety disorder is ongoing anxiety that isn't related to a particular event or situation, or is out of proportion to what you would expect. For instance, a person who has generalized anxiety disorder may constantly worry about a child who is perfectly healthy. About 4 million adults in the [facility] have ANTONIO. Women are more likely to have it than men. It usually begins to affect people when they are in their early 25s. Symptoms   How do I know if I have ANTONIO? Most people worry from time to time, and these occasional worries are normal. They don't mean that you have ANTONIO.  If you have ANTONIO, you worry so much that it interferes with your day-to-day life, and you feel tense and worried more days than not. Other signs of ANTONIO include the following:  Trouble falling or staying asleep   Muscle tension   Irritability   Trouble concentrating   Getting tired easily   Restlessness, or feeling \"keyed up\" or on edge   Trembling   Shortness of breath   Fast heartbeat   Dry mouth   Dizziness   Nausea   If you feel tense most of the time and have some of these symptoms, talk to your doctor. Your doctor will probably examine you and ask some questions to make sure that something else isn't causing your symptoms. Sometimes certain kinds of medicine may cause ANTONIO. You could also have these symptoms if your thyroid gland is too active, or if you are depressed. However, if your doctor doesn't find any other reason for your symptoms, you may need to be treated for ANTONIO. Causes & Risk Factors   What causes anxiety disorders? Suppose the fire alarm goes off in your home. You race around frantically to find the fire. Instead, you find that there is no fire--the alarm just isn't working properly. It's the same with anxiety disorders. Your body mistakenly triggers your alarm system when there is no danger. This may be due to a chemical imbalance in your body. It may also be related to an unconscious memory, to a side effect of a medicine or to an illness. Treatment   How is ANTONIO treated? People who have ANTONIO must learn ways to cope with anxiety and worry. Talk to your family doctor if you think you have an anxiety disorder. He or she can help you form a plan to develop skills to cope with your anxiety. Tejinder Batters probably need some counseling to help you figure out what's making you so tense. Also, you may need to take some medicine to help you feel less anxious. Your doctor can recommend the treatment that is right for you. The following are some tips on coping with anxiety:    Control your worry.  Choose a place and time to do your worrying. Make it the same place and time every day. Spend 30 minutes thinking about your concerns and what you can do about them. Try not to dwell on what \"might\" happen. Focus more on what's really happening. Then let go of the worry and go on with your day. Learn ways to relax. These may include muscle relaxation, yoga, or deep breathing. Steps to deep breathing  1. Lie down on a flat surface. 2. Place one hand on your stomach, just above your navel. Place the other hand on your chest.   3. Breathe in slowly and try to make your stomach rise a little. 4. Hold your breath for a second. 5. Breathe out slowly and let your stomach go back down. Muscle relaxation is simple. Start by choosing a muscle and holding it tight for a few seconds. Then relax the muscle. Do this with all of your muscles, one part of your body at a time. Try starting with your feet muscles and working your way up your body. Exercise regularly. People who have anxiety often quit exercising. But exercise can give you a sense of well being and help decrease feelings of anxiety. Get plenty of sleep. Sleep rests your brain as well as your body, and can improve your general sense of wellbeing as well as your mood. Avoid alcohol and drug abuse. It may seem that alcohol or drugs relax you. But in the long run they make anxiety worse and cause more problems. Avoid caffeine. Caffeine is found in coffee, tea, soft drinks and chocolate. Caffeine may increase your sense of anxiety because it stimulates your nervous system. Also avoid over-the-counter diet pills, and cough and cold medicines that contain a decongestant. Confront the things that have made you anxious in the past. Begin by just picturing yourself confronting these things. By doing this, you can get used to the idea of confronting the things that make you anxious before you actually do it.  After you feel more comfortable picturing yourself confronting these things, you can begin to actually face them. If you feel yourself getting anxious, practice a relaxation technique or focus on a simple task, such as counting backward from 100 to 0. Although feelings of anxiety are scary, they won't hurt you. Label the level of your fear from 0 to 10 and keep track as it goes up and down. Notice that it doesn't stay at a very high level for more than a few seconds. When the fear comes, accept it. Wait and give it time to pass without running away from it. Use medicine if it helps. Your doctor may give you medicine to help reduce your anxiety while you learn new ways to respond to the things that make you anxious. Many types of medicine are available. Your doctor will decide which medicine is right for you. People who have ANTONIO can get better. If you take medicine for generalized anxiety disorder, you may be able to stop taking it at some point in the future. Your doctor will tell you if it's OK to stop taking your medicine.

## 2021-06-13 DIAGNOSIS — E11.9 CONTROLLED TYPE 2 DIABETES MELLITUS WITHOUT COMPLICATION, WITH LONG-TERM CURRENT USE OF INSULIN (HCC): ICD-10-CM

## 2021-06-13 DIAGNOSIS — Z79.4 CONTROLLED TYPE 2 DIABETES MELLITUS WITHOUT COMPLICATION, WITH LONG-TERM CURRENT USE OF INSULIN (HCC): ICD-10-CM

## 2021-06-14 RX ORDER — DAPAGLIFLOZIN 10 MG/1
TABLET, FILM COATED ORAL
Qty: 30 TABLET | Refills: 5 | OUTPATIENT
Start: 2021-06-14

## 2021-09-13 ENCOUNTER — OFFICE VISIT (OUTPATIENT)
Dept: FAMILY MEDICINE CLINIC | Age: 21
End: 2021-09-13
Payer: COMMERCIAL

## 2021-09-13 VITALS
DIASTOLIC BLOOD PRESSURE: 84 MMHG | HEIGHT: 74 IN | BODY MASS INDEX: 39.32 KG/M2 | WEIGHT: 306.38 LBS | HEART RATE: 81 BPM | RESPIRATION RATE: 14 BRPM | OXYGEN SATURATION: 98 % | SYSTOLIC BLOOD PRESSURE: 122 MMHG

## 2021-09-13 DIAGNOSIS — E66.01 CLASS 2 SEVERE OBESITY DUE TO EXCESS CALORIES WITH SERIOUS COMORBIDITY AND BODY MASS INDEX (BMI) OF 38.0 TO 38.9 IN ADULT (HCC): ICD-10-CM

## 2021-09-13 DIAGNOSIS — E11.9 CONTROLLED TYPE 2 DIABETES MELLITUS WITHOUT COMPLICATION, WITHOUT LONG-TERM CURRENT USE OF INSULIN (HCC): Primary | ICD-10-CM

## 2021-09-13 LAB — HBA1C MFR BLD: 5.3 %

## 2021-09-13 PROCEDURE — 99214 OFFICE O/P EST MOD 30 MIN: CPT | Performed by: FAMILY MEDICINE

## 2021-09-13 PROCEDURE — 83036 HEMOGLOBIN GLYCOSYLATED A1C: CPT | Performed by: FAMILY MEDICINE

## 2021-09-13 NOTE — PROGRESS NOTES
CHRONIC CONDITION NOTE   Subjective:   HPI CHRONIC:   Chief Complaint   Patient presents with    Diabetes     Patient does not check BS at home. Patient here for follow-up of multiple chronic conditions includin. Controlled type 2 diabetes mellitus without complication, without long-term current use of insulin (HCC)    2. Class 2 severe obesity due to excess calories with serious comorbidity and body mass index (BMI) of 38.0 to 38.9 in adult [facility])      Complaints: none. Working again. · Following appropriate diet? Sometimes  · Exercise:  never  · Taking medicines daily as directed? Yes  · Any side effects of medications? No    Health Maintenance Due   Topic Date Due    Pneumococcal 0-64 years Vaccine (1 of 2 - PPSV23) 2006    Diabetic retinal exam  Never done    Diabetic microalbuminuria test  2021    Lipid screen  2021    Flu vaccine (1) 2021       CHART REVIEW   reports that he has never smoked. He has never used smokeless tobacco.  Health Maintenance Due   Topic Date Due    Pneumococcal 0-64 years Vaccine (1 of 2 - PPSV23) 2006    Diabetic retinal exam  Never done    Diabetic microalbuminuria test  2021    Lipid screen  2021    Flu vaccine (1) 2021     Current Outpatient Medications   Medication Instructions    Blood Glucose Monitoring Suppl (ONE TOUCH ULTRA 2) w/Device KIT USE AS DIRECTED    blood glucose test strips (ASCENSIA AUTODISC VI;ONE TOUCH ULTRA TEST VI) strip 1 each, In Vitro, 2 TIMES DAILY, As needed.     cetirizine (ZYRTEC) 10 mg, Oral, DAILY    CPAP Machine MISC Does not apply    dapagliflozin (FARXIGA) 10 mg, Oral, EVERY MORNING    escitalopram (LEXAPRO) 20 mg, Oral, DAILY    fluticasone (FLONASE) 50 MCG/ACT nasal spray SPRAY 2 SPRAYS INTO EACH NOSTRIL EVERY DAY    Insulin Pen Needle 32G X 4 MM MISC 1 each, SubCUTAneous, DAILY    Lancets MISC 1 each, Does not apply, 2 TIMES DAILY    montelukast (SINGULAIR) 10 mg, Oral, NIGHTLY     LAST LABS  LDL Calculated   Date Value Ref Range Status   04/07/2020 86 <100 mg/dL Final     Lab Results   Component Value Date    HDL 32 (L) 04/07/2020     Lab Results   Component Value Date    TRIG 212 (H) 04/07/2020     Lab Results   Component Value Date     04/07/2020    K 4.6 04/07/2020    CREATININE 1.9 (H) 04/07/2020     No results found for: WBC, HGB, PLT  Lab Results   Component Value Date    ALT 59 (H) 04/07/2020    AST 31 04/07/2020    ALKPHOS 130 (H) 04/07/2020    BILITOT 1.9 (H) 04/07/2020     No results found for: TSH  No components found for: GLU  Lab Results   Component Value Date    LABA1C 5.3 05/06/2021    LABA1C 5.6 11/09/2020    LABA1C 10.8 04/07/2020     Objective:   PHYSICAL EXAM   /84 (Site: Right Upper Arm, Position: Sitting, Cuff Size: Large Adult)   Pulse 81   Resp 14   Ht 6' 2\" (1.88 m)   Wt (!) 306 lb 6 oz (139 kg)   SpO2 98%   BMI 39.34 kg/m²   BP Readings from Last 5 Encounters:   09/13/21 122/84   05/06/21 128/84   12/02/20 120/76   11/09/20 130/84   11/08/20 119/80     Wt Readings from Last 5 Encounters:   09/13/21 (!) 306 lb 6 oz (139 kg)   05/06/21 295 lb 9.6 oz (134.1 kg)   12/02/20 (!) 309 lb (140.2 kg) (>99 %, Z= 3.15)*   11/09/20 (!) 308 lb (139.7 kg) (>99 %, Z= 3.15)*   11/08/20 (!) 306 lb 7 oz (139 kg) (>99 %, Z= 3.13)*     * Growth percentiles are based on CDC (Boys, 2-20 Years) data. Ht Readings from Last 3 Encounters:   09/13/21 6' 2\" (1.88 m)   05/06/21 6' 2\" (1.88 m)   12/02/20 6' 2\" (1.88 m) (94 %, Z= 1.56)*     * Growth percentiles are based on CDC (Boys, 2-20 Years) data. GENERAL:   · well-developed, well-nourished, alert, no distress. LUNGS:    · Breathing unlabored  · clear to auscultation bilaterally and good air movement  CARDIOVASC:   · regular rate and rhythm  · LEGS:  Lower extremity edema: none    SKIN: warm and dry     Assessment and Plan:      Diagnosis Orders   1.  Controlled type 2 diabetes mellitus without complication, without long-term current use of insulin (HCC)  Diabetic Foot Exam    POCT glycosylated hemoglobin (Hb A1C)    MICROALBUMIN / CREATININE URINE RATIO    Comprehensive Metabolic Panel    Lipid Panel    Ambulatory Referral To Diabetes Education   2. Class 2 severe obesity due to excess calories with serious comorbidity and body mass index (BMI) of 38.0 to 38.9 in adult [facility])     Stable     Continue current Tx plan. Any changes marked below. INSTRUCTIONS  · NEXT APPOINTMENT: Please schedule check-up in 3 months. · Dr. Refugio Henderson and River [address] (Nurse Practitioner) are using a team approach or managing diabetics in our practice. Office visits will alternate between these providers while we continue to maintain high quality of care. · PLEASE GET BLOODWORK DRAWN TODAY ON FIRST FLOOR in 170. Take orders with you. RESULTS- most blood tests back in couple days. We will call you if any problems. If bloodwork good, you will get letter in mail or notified thru [address] (if signed up) within 2 weeks. If you do not, please call office. · Please have eye doctor fax last eye exam to [phone]  · Get back to daily exercise. · Reduce portion sizes. · Get that 10 lbs back off.

## 2021-09-13 NOTE — PATIENT INSTRUCTIONS
INSTRUCTIONS  · NEXT APPOINTMENT: Please schedule check-up in 3 months. · Dr. Lucy Johnson and Flex Kong (Nurse Practitioner) are using a team approach or managing diabetics in our practice. Office visits will alternate between these providers while we continue to maintain high quality of care. · PLEASE GET BLOODWORK DRAWN TODAY ON FIRST FLOOR in 170. Take orders with you. RESULTS- most blood tests back in couple days. We will call you if any problems. If bloodwork good, you will get letter in mail or notified thru [address] (if signed up) within 2 weeks. If you do not, please call office. · Please have eye doctor fax last eye exam to [phone]  · Get back to daily exercise. · Reduce portion sizes. · Get that 10 lbs back off.

## 2021-11-08 DIAGNOSIS — Z91.09 ENVIRONMENTAL ALLERGIES: ICD-10-CM

## 2021-11-08 RX ORDER — MONTELUKAST SODIUM 10 MG/1
TABLET ORAL
Qty: 90 TABLET | Refills: 1 | Status: SHIPPED | OUTPATIENT
Start: 2021-11-08

## 2022-01-12 ENCOUNTER — TELEPHONE (OUTPATIENT)
Dept: FAMILY MEDICINE CLINIC | Age: 22
End: 2022-01-12

## 2022-01-12 DIAGNOSIS — R50.9 FEVER, UNSPECIFIED FEVER CAUSE: ICD-10-CM

## 2022-01-12 DIAGNOSIS — J02.9 SORE THROAT: Primary | ICD-10-CM

## 2022-01-13 ENCOUNTER — NURSE ONLY (OUTPATIENT)
Dept: PRIMARY CARE CLINIC | Age: 22
End: 2022-01-13
Payer: COMMERCIAL

## 2022-01-13 DIAGNOSIS — J02.9 SORE THROAT: ICD-10-CM

## 2022-01-13 DIAGNOSIS — R05.9 COUGH: Primary | ICD-10-CM

## 2022-01-13 LAB
INFLUENZA A ANTIBODY: NORMAL
INFLUENZA B ANTIBODY: NORMAL
S PYO AG THROAT QL: NORMAL
SARS-COV-2: DETECTED

## 2022-01-13 PROCEDURE — 87880 STREP A ASSAY W/OPTIC: CPT | Performed by: FAMILY MEDICINE

## 2022-01-13 PROCEDURE — 87804 INFLUENZA ASSAY W/OPTIC: CPT | Performed by: FAMILY MEDICINE

## 2022-01-15 LAB — THROAT CULTURE: NORMAL

## 2022-04-11 DIAGNOSIS — E11.9 CONTROLLED TYPE 2 DIABETES MELLITUS WITHOUT COMPLICATION, WITH LONG-TERM CURRENT USE OF INSULIN (HCC): ICD-10-CM

## 2022-04-11 DIAGNOSIS — Z79.4 CONTROLLED TYPE 2 DIABETES MELLITUS WITHOUT COMPLICATION, WITH LONG-TERM CURRENT USE OF INSULIN (HCC): ICD-10-CM

## 2022-04-11 RX ORDER — DAPAGLIFLOZIN 10 MG/1
TABLET, FILM COATED ORAL
Qty: 30 TABLET | Refills: 3 | OUTPATIENT
Start: 2022-04-11

## 2022-04-11 NOTE — TELEPHONE ENCOUNTER
LOV 9/2021. Asked to return in 3 months. No appt scheduled for diabetes and 4 months overdue. Does he have another physician he is seeing for diabetes?

## 2022-04-12 NOTE — TELEPHONE ENCOUNTER
Called patient scheduled appt 4/21/22 with jc pedersen. Patient sees Dr. Kenneth Luna for diabetes does not see another provider.    Please send refill